# Patient Record
Sex: MALE | Race: WHITE
[De-identification: names, ages, dates, MRNs, and addresses within clinical notes are randomized per-mention and may not be internally consistent; named-entity substitution may affect disease eponyms.]

---

## 2018-08-02 ENCOUNTER — HOSPITAL ENCOUNTER (OUTPATIENT)
Dept: HOSPITAL 89 - OR | Age: 43
Discharge: HOME | End: 2018-08-02
Attending: SURGERY
Payer: COMMERCIAL

## 2018-08-02 VITALS — SYSTOLIC BLOOD PRESSURE: 89 MMHG | DIASTOLIC BLOOD PRESSURE: 60 MMHG

## 2018-08-02 VITALS — DIASTOLIC BLOOD PRESSURE: 71 MMHG | SYSTOLIC BLOOD PRESSURE: 90 MMHG

## 2018-08-02 VITALS — SYSTOLIC BLOOD PRESSURE: 97 MMHG | DIASTOLIC BLOOD PRESSURE: 62 MMHG

## 2018-08-02 VITALS — SYSTOLIC BLOOD PRESSURE: 124 MMHG | DIASTOLIC BLOOD PRESSURE: 89 MMHG

## 2018-08-02 VITALS — WEIGHT: 199 LBS | BODY MASS INDEX: 26.37 KG/M2 | HEIGHT: 73 IN

## 2018-08-02 VITALS — DIASTOLIC BLOOD PRESSURE: 70 MMHG | SYSTOLIC BLOOD PRESSURE: 89 MMHG

## 2018-08-02 DIAGNOSIS — D12.2: ICD-10-CM

## 2018-08-02 DIAGNOSIS — Z12.11: Primary | ICD-10-CM

## 2018-08-02 PROCEDURE — 88305 TISSUE EXAM BY PATHOLOGIST: CPT

## 2018-08-02 PROCEDURE — 45385 COLONOSCOPY W/LESION REMOVAL: CPT

## 2018-08-02 PROCEDURE — 00811 ANES LWR INTST NDSC NOS: CPT

## 2018-08-02 RX ADMIN — Medication ONE ML: at 07:32

## 2018-08-02 RX ADMIN — Medication ONE ML: at 07:55

## 2018-08-02 NOTE — POST OPERATIVE PROGRESS NOTE
Post Operative Progress Note


Date:  Aug 2, 2018


Time:  09:53


Surgeon:  


Ullrich





Dictation number:  [id number]


Anesthesia:  


TIVA by Dr. Camacho


Pre-Op Diagnosis:  


FH CRC


Post-Op Diagnosis:  


FH CRC


Ascending colon polyp


Findings:  


Excellent prep


19 minute withdrawl time


Single 3mm polyp in ascending colon


Procedure(s):  


Colonoscopy with snare polypectomy


Specimen Removed:(May be N/A):  


Ascending colon polyp


Complications:  


None


Fluids:  


See anesthesia record


Estimated Blood Loss:  


None


Date OP Note Dictated:  Aug 2, 2018


Time OP Note Dictated:  09:54











ULLRICH,JOHN A MD Aug 2, 2018 09:58

## 2018-08-02 NOTE — ULLRICH COLONOSCOPY
EVENT DATE: August 2, 2018

SURGEON: John Ullrich, MD

ANESTHESIOLOGIST: Frank Camacho M.D.

ANESTHESIA:  TIVA.





PREOPERATIVE DIAGNOSIS  

Family history of colorectal cancer.



POSTOPERATIVE DIAGNOSIS 

1.  Family history of colorectal cancer.

2.  Ascending colon polyp.



PROCEDURE PERFORMED 

Colonoscopy with snare polypectomy.



SPECIMEN

Ascending colon polyp.



FINDINGS

This patient had excellent prep.  Withdrawal time is 19 minutes.  I found a 
single, approximately 3 mm, polyp in the ascending colon, which was removed 
with snare.



INDICATIONS

This is 42]-year-old gentleman with a family history of colorectal cancer.  He 
has no GI symptoms.  He is requesting colonoscopy for screening.  He is due for 
this because of his family history.



DESCRIPTION OF PROCEDURE 

The patient was brought to the endoscopy room and placed in the left lateral 
decubitus position on the gurney.  TIVA anesthesia was administered and digital 
rectal exam was completed, which revealed some small perianal skin tags and 
hemorrhoids but otherwise was unremarkable. The colonoscope was set up and 
tested to ensure it was completely functional and was lubricated and inserted 
into the rectum through his anus.  I advanced the scope with no difficulty all 
the way to the cecum and slowly withdrew the scope as I looked at all mucosa, 
looking for any abnormalities.  There was a small polyp in the ascending colon, 
which was easily removed with snare and sent to pathology.  When the tip of the 
scope was in the rectum, I retroflexed the scope to look at the anal canal and 
distal rectum and this was unremarkable.  I then straightened the scope out and 
desufflated the rectum and removed the scope from the patient's rectum.  The 
patient had excellent prep and withdrawal time was 19 minutes.  Other than the 
polyp in the ascending colon, no other abnormalities were found.  I will 
recommend another colonoscopy in five years for this patient with a family 
history of colorectal cancer. 

[facility]

## 2018-08-02 NOTE — SHORT(OUTPT) DISCHARGE SUMMARY
Discharge Summary


Reason for Hosp/Final Diag:  


(1) Family history of colon cancer


Hospital Course & Plan:  Colonoscopy with polypectomy x1 completed without 

problems.





Departure


Discharge to:  Home, Self Care





Discharge Instructions


Home Meds


Reported Medications


Levothyroxine Sodium (SYNTHROID) Unknown Strength Tablet, 100 MCG PO QDAY


   7/17/18


Lisinopril (LISINOPRIL) Unknown Strength Tablet, 5 MG PO QDAY, TAB


   7/17/18


Montelukast Sodium (SINGULAIR) 10 Mg Tablet, 1 TAB PO QDAY, TAB


   7/17/18


Diet:  Regular


Activity:  As Tolerated


Special Instructions:  


Your colonoscopy was completed without any problems and your prep was


excellent (Good Job!!).  I removed a single tiny polyp from your ascending


colon and it was sent to pathology.  I will let you know what the


pathology results are when I get them back.  At any rate, because of your


family history, I recommend that you have another colonoscopy in 5 years.











ULLRICH,JOHN A MD Aug 2, 2018 09:53